# Patient Record
Sex: MALE
[De-identification: names, ages, dates, MRNs, and addresses within clinical notes are randomized per-mention and may not be internally consistent; named-entity substitution may affect disease eponyms.]

---

## 2019-08-14 VITALS — SYSTOLIC BLOOD PRESSURE: 97 MMHG | DIASTOLIC BLOOD PRESSURE: 62 MMHG

## 2019-08-14 LAB
BASOPHILS NFR BLD AUTO: 0.8 % (ref 0–5)
EOSINOPHIL NFR BLD AUTO: 2.8 % (ref 0–8)
ERYTHROCYTE [DISTWIDTH] IN BLOOD BY AUTOMATED COUNT: 13.9 % (ref 11–15.5)
HCT VFR BLD AUTO: 42.5 % (ref 42–54)
LYMPHOCYTES NFR SPEC AUTO: 28.2 % (ref 21–51)
MCH RBC QN AUTO: 28.7 PG (ref 27–33)
MCHC RBC AUTO-ENTMCNC: 33.1 G/DL (ref 32–36)
MCV RBC AUTO: 86.8 FL (ref 79–99)
MONOCYTES NFR BLD AUTO: 10.1 % (ref 3–13)
NEUTROPHILS NFR BLD AUTO: 58.1 % (ref 40–77)
NRBC BLD MANUAL-RTO: 0 % (ref 0–0.19)
PLATELET # BLD AUTO: 312 K/UL (ref 130–400)
RBC # BLD AUTO: 4.9 MIL/UL (ref 4.5–6.2)
WBC # BLD AUTO: 6.5 K/UL (ref 4.8–10.8)

## 2019-08-15 ENCOUNTER — HOSPITAL ENCOUNTER (OUTPATIENT)
Dept: HOSPITAL 90 - DAH | Age: 59
Discharge: HOME | End: 2019-08-15
Attending: SURGERY
Payer: COMMERCIAL

## 2019-08-15 VITALS — SYSTOLIC BLOOD PRESSURE: 114 MMHG | DIASTOLIC BLOOD PRESSURE: 64 MMHG

## 2019-08-15 VITALS — BODY MASS INDEX: 23.33 KG/M2 | WEIGHT: 170.4 LBS | HEIGHT: 71.5 IN

## 2019-08-15 VITALS — DIASTOLIC BLOOD PRESSURE: 75 MMHG | SYSTOLIC BLOOD PRESSURE: 109 MMHG

## 2019-08-15 VITALS — DIASTOLIC BLOOD PRESSURE: 68 MMHG | SYSTOLIC BLOOD PRESSURE: 118 MMHG

## 2019-08-15 VITALS — SYSTOLIC BLOOD PRESSURE: 114 MMHG | DIASTOLIC BLOOD PRESSURE: 68 MMHG

## 2019-08-15 VITALS — SYSTOLIC BLOOD PRESSURE: 113 MMHG | DIASTOLIC BLOOD PRESSURE: 66 MMHG

## 2019-08-15 VITALS — SYSTOLIC BLOOD PRESSURE: 118 MMHG | DIASTOLIC BLOOD PRESSURE: 71 MMHG

## 2019-08-15 VITALS — SYSTOLIC BLOOD PRESSURE: 111 MMHG | DIASTOLIC BLOOD PRESSURE: 65 MMHG

## 2019-08-15 VITALS — DIASTOLIC BLOOD PRESSURE: 75 MMHG | SYSTOLIC BLOOD PRESSURE: 120 MMHG

## 2019-08-15 VITALS — DIASTOLIC BLOOD PRESSURE: 69 MMHG | SYSTOLIC BLOOD PRESSURE: 120 MMHG

## 2019-08-15 VITALS — DIASTOLIC BLOOD PRESSURE: 63 MMHG | SYSTOLIC BLOOD PRESSURE: 103 MMHG

## 2019-08-15 VITALS — DIASTOLIC BLOOD PRESSURE: 71 MMHG | SYSTOLIC BLOOD PRESSURE: 115 MMHG

## 2019-08-15 VITALS — DIASTOLIC BLOOD PRESSURE: 73 MMHG | SYSTOLIC BLOOD PRESSURE: 126 MMHG

## 2019-08-15 VITALS — SYSTOLIC BLOOD PRESSURE: 121 MMHG | DIASTOLIC BLOOD PRESSURE: 65 MMHG

## 2019-08-15 VITALS — DIASTOLIC BLOOD PRESSURE: 70 MMHG | SYSTOLIC BLOOD PRESSURE: 112 MMHG

## 2019-08-15 VITALS — SYSTOLIC BLOOD PRESSURE: 122 MMHG | DIASTOLIC BLOOD PRESSURE: 75 MMHG

## 2019-08-15 VITALS — DIASTOLIC BLOOD PRESSURE: 69 MMHG | SYSTOLIC BLOOD PRESSURE: 117 MMHG

## 2019-08-15 VITALS — DIASTOLIC BLOOD PRESSURE: 72 MMHG | SYSTOLIC BLOOD PRESSURE: 117 MMHG

## 2019-08-15 DIAGNOSIS — E78.5: ICD-10-CM

## 2019-08-15 DIAGNOSIS — N40.0: ICD-10-CM

## 2019-08-15 DIAGNOSIS — K40.90: Primary | ICD-10-CM

## 2019-08-15 PROCEDURE — 36415 COLL VENOUS BLD VENIPUNCTURE: CPT

## 2019-08-15 PROCEDURE — 85025 COMPLETE CBC W/AUTO DIFF WBC: CPT

## 2019-08-15 PROCEDURE — 49505 PRP I/HERN INIT REDUC >5 YR: CPT

## 2019-08-15 NOTE — NUR
dc

dc instructions given to to pt spouse with rx, instructed to f/u with dr. elizabeth, to 
continue home meds. and on new med regimen. left inguinal dressing dry and intact , no 
distress noted. denied any pain or discomforts

## 2019-08-15 NOTE — NUR
POST

RECEIVED PT FROM PACU, S/P LEFT INGUINAL  HERNIA REPAIR, DRESSING TO SITE DRY AND INTACT, VS 
STABLE ON ARRIVAL PT DENIED ANY PAIN OR DISCOMFORTS. PLAN OF CARE DISCUSS WITH PATIENT/ 
SPOUSE